# Patient Record
Sex: MALE | Race: BLACK OR AFRICAN AMERICAN
[De-identification: names, ages, dates, MRNs, and addresses within clinical notes are randomized per-mention and may not be internally consistent; named-entity substitution may affect disease eponyms.]

---

## 2017-01-03 ENCOUNTER — APPOINTMENT (OUTPATIENT)
Dept: INTERNAL MEDICINE | Facility: CLINIC | Age: 37
End: 2017-01-03

## 2017-01-03 VITALS
SYSTOLIC BLOOD PRESSURE: 126 MMHG | BODY MASS INDEX: 28.34 KG/M2 | HEART RATE: 57 BPM | DIASTOLIC BLOOD PRESSURE: 78 MMHG | HEIGHT: 77 IN | WEIGHT: 240 LBS | TEMPERATURE: 98.6 F | RESPIRATION RATE: 17 BRPM | OXYGEN SATURATION: 100 %

## 2017-01-05 ENCOUNTER — APPOINTMENT (OUTPATIENT)
Dept: PAIN MANAGEMENT | Facility: CLINIC | Age: 37
End: 2017-01-05

## 2017-01-05 DIAGNOSIS — F07.81 POSTCONCUSSIONAL SYNDROME: ICD-10-CM

## 2017-01-05 DIAGNOSIS — R51 HEADACHE: ICD-10-CM

## 2017-04-11 ENCOUNTER — APPOINTMENT (OUTPATIENT)
Dept: INTERNAL MEDICINE | Facility: CLINIC | Age: 37
End: 2017-04-11

## 2017-04-11 VITALS
HEIGHT: 77 IN | DIASTOLIC BLOOD PRESSURE: 78 MMHG | SYSTOLIC BLOOD PRESSURE: 144 MMHG | OXYGEN SATURATION: 98 % | HEART RATE: 59 BPM | RESPIRATION RATE: 16 BRPM | WEIGHT: 245 LBS | TEMPERATURE: 98.5 F | BODY MASS INDEX: 28.93 KG/M2

## 2017-04-11 DIAGNOSIS — Z87.09 PERSONAL HISTORY OF OTHER DISEASES OF THE RESPIRATORY SYSTEM: ICD-10-CM

## 2017-05-04 ENCOUNTER — APPOINTMENT (OUTPATIENT)
Dept: INTERNAL MEDICINE | Facility: CLINIC | Age: 37
End: 2017-05-04

## 2017-05-04 VITALS
HEIGHT: 77 IN | DIASTOLIC BLOOD PRESSURE: 80 MMHG | SYSTOLIC BLOOD PRESSURE: 131 MMHG | TEMPERATURE: 98.7 F | WEIGHT: 245 LBS | HEART RATE: 59 BPM | OXYGEN SATURATION: 98 % | RESPIRATION RATE: 17 BRPM | BODY MASS INDEX: 28.93 KG/M2

## 2017-05-04 DIAGNOSIS — M54.5 LOW BACK PAIN: ICD-10-CM

## 2017-05-08 LAB
25(OH)D3 SERPL-MCNC: 26.7 NG/ML
ADJUSTED MITOGEN: >10 IU/ML
ADJUSTED TB AG: -0.04 IU/ML
ALBUMIN SERPL ELPH-MCNC: 4.7 G/DL
ALP BLD-CCNC: 104 U/L
ALT SERPL-CCNC: 56 U/L
ANION GAP SERPL CALC-SCNC: 17 MMOL/L
APPEARANCE: CLEAR
AST SERPL-CCNC: 102 U/L
BASOPHILS # BLD AUTO: 0.01 K/UL
BASOPHILS NFR BLD AUTO: 0.2 %
BILIRUB SERPL-MCNC: 0.3 MG/DL
BILIRUBIN URINE: NEGATIVE
BLOOD URINE: NEGATIVE
BUN SERPL-MCNC: 18 MG/DL
CALCIUM SERPL-MCNC: 10.3 MG/DL
CHLORIDE SERPL-SCNC: 100 MMOL/L
CHOLEST SERPL-MCNC: 200 MG/DL
CHOLEST/HDLC SERPL: 3.1 RATIO
CO2 SERPL-SCNC: 23 MMOL/L
COLOR: YELLOW
CREAT SERPL-MCNC: 1.36 MG/DL
EOSINOPHIL # BLD AUTO: 0.17 K/UL
EOSINOPHIL NFR BLD AUTO: 3.8 %
GLUCOSE QUALITATIVE U: NORMAL MG/DL
GLUCOSE SERPL-MCNC: 90 MG/DL
HAV IGG+IGM SER QL: REACTIVE
HBA1C MFR BLD HPLC: 6 %
HBV SURFACE AB SER QL: REACTIVE
HCT VFR BLD CALC: 42.3 %
HCV AB SER QL: NONREACTIVE
HCV S/CO RATIO: 0.09 S/CO
HDLC SERPL-MCNC: 64 MG/DL
HGB BLD-MCNC: 14.9 G/DL
HIV1+2 AB SPEC QL IA.RAPID: NONREACTIVE
IMM GRANULOCYTES NFR BLD AUTO: 0 %
KETONES URINE: NEGATIVE
LDLC SERPL CALC-MCNC: 122 MG/DL
LEUKOCYTE ESTERASE URINE: NEGATIVE
LYMPHOCYTES # BLD AUTO: 2 K/UL
LYMPHOCYTES NFR BLD AUTO: 44.4 %
M TB IFN-G BLD-IMP: NEGATIVE
MAN DIFF?: NORMAL
MCHC RBC-ENTMCNC: 29.8 PG
MCHC RBC-ENTMCNC: 35.2 GM/DL
MCV RBC AUTO: 84.6 FL
MONOCYTES # BLD AUTO: 0.32 K/UL
MONOCYTES NFR BLD AUTO: 7.1 %
NEUTROPHILS # BLD AUTO: 2 K/UL
NEUTROPHILS NFR BLD AUTO: 44.5 %
NITRITE URINE: NEGATIVE
PH URINE: 8
PLATELET # BLD AUTO: 218 K/UL
POTASSIUM SERPL-SCNC: 4.7 MMOL/L
PROT SERPL-MCNC: 8.1 G/DL
PROTEIN URINE: NEGATIVE MG/DL
QUANTIFERON GOLD NIL: 0.09 IU/ML
RBC # BLD: 5 M/UL
RBC # FLD: 13.1 %
SODIUM SERPL-SCNC: 140 MMOL/L
SPECIFIC GRAVITY URINE: 1.01
TRIGL SERPL-MCNC: 72 MG/DL
TSH SERPL-ACNC: 0.9 UIU/ML
UROBILINOGEN URINE: NORMAL MG/DL
WBC # FLD AUTO: 4.5 K/UL

## 2017-05-22 ENCOUNTER — NON-APPOINTMENT (OUTPATIENT)
Age: 37
End: 2017-05-22

## 2017-05-22 ENCOUNTER — APPOINTMENT (OUTPATIENT)
Dept: INTERNAL MEDICINE | Facility: CLINIC | Age: 37
End: 2017-05-22

## 2017-05-22 VITALS
TEMPERATURE: 98.6 F | DIASTOLIC BLOOD PRESSURE: 70 MMHG | BODY MASS INDEX: 28.69 KG/M2 | HEART RATE: 54 BPM | OXYGEN SATURATION: 98 % | SYSTOLIC BLOOD PRESSURE: 126 MMHG | HEIGHT: 77 IN | WEIGHT: 243 LBS | RESPIRATION RATE: 16 BRPM

## 2017-05-22 DIAGNOSIS — R73.03 PREDIABETES.: ICD-10-CM

## 2017-05-22 DIAGNOSIS — Z00.00 ENCOUNTER FOR GENERAL ADULT MEDICAL EXAMINATION W/OUT ABNORMAL FINDINGS: ICD-10-CM

## 2017-05-22 DIAGNOSIS — R94.5 ABNORMAL RESULTS OF LIVER FUNCTION STUDIES: ICD-10-CM

## 2017-05-23 LAB
ALBUMIN SERPL ELPH-MCNC: 4.5 G/DL
ALP BLD-CCNC: 93 U/L
ALT SERPL-CCNC: 41 U/L
AST SERPL-CCNC: 35 U/L
BILIRUB DIRECT SERPL-MCNC: 0.1 MG/DL
BILIRUB INDIRECT SERPL-MCNC: 0.1 MG/DL
BILIRUB SERPL-MCNC: 0.2 MG/DL
HBV SURFACE AG SER QL: NONREACTIVE
IRON SATN MFR SERPL: 17 %
IRON SERPL-MCNC: 52 UG/DL
PROT SERPL-MCNC: 7.8 G/DL
TIBC SERPL-MCNC: 306 UG/DL
TRANSFERRIN SERPL-MCNC: 264 MG/DL
UIBC SERPL-MCNC: 254 UG/DL

## 2017-06-21 ENCOUNTER — APPOINTMENT (OUTPATIENT)
Dept: INTERNAL MEDICINE | Facility: CLINIC | Age: 37
End: 2017-06-21

## 2017-06-21 VITALS
TEMPERATURE: 98.2 F | BODY MASS INDEX: 28.57 KG/M2 | DIASTOLIC BLOOD PRESSURE: 80 MMHG | WEIGHT: 242 LBS | HEIGHT: 77 IN | HEART RATE: 74 BPM | OXYGEN SATURATION: 100 % | RESPIRATION RATE: 16 BRPM | SYSTOLIC BLOOD PRESSURE: 130 MMHG

## 2017-06-21 DIAGNOSIS — Z86.69 PERSONAL HISTORY OF OTHER DISEASES OF THE NERVOUS SYSTEM AND SENSE ORGANS: ICD-10-CM

## 2017-06-21 DIAGNOSIS — H00.019 HORDEOLUM EXTERNUM UNSPECIFIED EYE, UNSPECIFIED EYELID: ICD-10-CM

## 2017-09-23 ENCOUNTER — INPATIENT (INPATIENT)
Facility: HOSPITAL | Age: 37
LOS: 0 days | Discharge: HOME | DRG: 355 | End: 2017-09-24
Attending: SURGERY | Admitting: SURGERY
Payer: OTHER GOVERNMENT

## 2017-09-23 VITALS
HEART RATE: 59 BPM | RESPIRATION RATE: 16 BRPM | TEMPERATURE: 98 F | WEIGHT: 242.07 LBS | OXYGEN SATURATION: 98 % | SYSTOLIC BLOOD PRESSURE: 121 MMHG | DIASTOLIC BLOOD PRESSURE: 56 MMHG | HEIGHT: 77 IN

## 2017-09-23 DIAGNOSIS — R10.9 UNSPECIFIED ABDOMINAL PAIN: ICD-10-CM

## 2017-09-23 DIAGNOSIS — K42.9 UMBILICAL HERNIA WITHOUT OBSTRUCTION OR GANGRENE: ICD-10-CM

## 2017-09-23 LAB
ANION GAP SERPL CALC-SCNC: 5 MMOL/L — SIGNIFICANT CHANGE UP (ref 5–17)
BASOPHILS # BLD AUTO: 0.1 K/UL — SIGNIFICANT CHANGE UP (ref 0–0.2)
BASOPHILS NFR BLD AUTO: 1.1 % — SIGNIFICANT CHANGE UP (ref 0–2)
BUN SERPL-MCNC: 10 MG/DL — SIGNIFICANT CHANGE UP (ref 7–18)
CALCIUM SERPL-MCNC: 9.4 MG/DL — SIGNIFICANT CHANGE UP (ref 8.4–10.5)
CHLORIDE SERPL-SCNC: 107 MMOL/L — SIGNIFICANT CHANGE UP (ref 96–108)
CK SERPL-CCNC: 1189 U/L — HIGH (ref 35–232)
CO2 SERPL-SCNC: 29 MMOL/L — SIGNIFICANT CHANGE UP (ref 22–31)
CREAT SERPL-MCNC: 1.31 MG/DL — HIGH (ref 0.5–1.3)
EOSINOPHIL # BLD AUTO: 0 K/UL — SIGNIFICANT CHANGE UP (ref 0–0.5)
EOSINOPHIL NFR BLD AUTO: 0.6 % — SIGNIFICANT CHANGE UP (ref 0–6)
GLUCOSE SERPL-MCNC: 89 MG/DL — SIGNIFICANT CHANGE UP (ref 70–99)
HCT VFR BLD CALC: 43.8 % — SIGNIFICANT CHANGE UP (ref 39–50)
HGB BLD-MCNC: 14.2 G/DL — SIGNIFICANT CHANGE UP (ref 13–17)
LYMPHOCYTES # BLD AUTO: 2.2 K/UL — SIGNIFICANT CHANGE UP (ref 1–3.3)
LYMPHOCYTES # BLD AUTO: 39.8 % — SIGNIFICANT CHANGE UP (ref 13–44)
MCHC RBC-ENTMCNC: 28 PG — SIGNIFICANT CHANGE UP (ref 27–34)
MCHC RBC-ENTMCNC: 32.4 GM/DL — SIGNIFICANT CHANGE UP (ref 32–36)
MCV RBC AUTO: 86.4 FL — SIGNIFICANT CHANGE UP (ref 80–100)
MONOCYTES # BLD AUTO: 0.5 K/UL — SIGNIFICANT CHANGE UP (ref 0–0.9)
MONOCYTES NFR BLD AUTO: 8.5 % — SIGNIFICANT CHANGE UP (ref 2–14)
NEUTROPHILS # BLD AUTO: 2.7 K/UL — SIGNIFICANT CHANGE UP (ref 1.8–7.4)
NEUTROPHILS NFR BLD AUTO: 50 % — SIGNIFICANT CHANGE UP (ref 43–77)
PLATELET # BLD AUTO: 193 K/UL — SIGNIFICANT CHANGE UP (ref 150–400)
POTASSIUM SERPL-MCNC: 3.9 MMOL/L — SIGNIFICANT CHANGE UP (ref 3.5–5.3)
POTASSIUM SERPL-SCNC: 3.9 MMOL/L — SIGNIFICANT CHANGE UP (ref 3.5–5.3)
RBC # BLD: 5.06 M/UL — SIGNIFICANT CHANGE UP (ref 4.2–5.8)
RBC # FLD: 11.7 % — SIGNIFICANT CHANGE UP (ref 10.3–14.5)
SODIUM SERPL-SCNC: 141 MMOL/L — SIGNIFICANT CHANGE UP (ref 135–145)
WBC # BLD: 5.5 K/UL — SIGNIFICANT CHANGE UP (ref 3.8–10.5)
WBC # FLD AUTO: 5.5 K/UL — SIGNIFICANT CHANGE UP (ref 3.8–10.5)

## 2017-09-23 PROCEDURE — 74177 CT ABD & PELVIS W/CONTRAST: CPT | Mod: 26

## 2017-09-23 PROCEDURE — 99222 1ST HOSP IP/OBS MODERATE 55: CPT | Mod: 57,AI

## 2017-09-23 PROCEDURE — 99285 EMERGENCY DEPT VISIT HI MDM: CPT

## 2017-09-23 RX ORDER — SODIUM CHLORIDE 9 MG/ML
1000 INJECTION INTRAMUSCULAR; INTRAVENOUS; SUBCUTANEOUS ONCE
Qty: 0 | Refills: 0 | Status: COMPLETED | OUTPATIENT
Start: 2017-09-23 | End: 2017-09-23

## 2017-09-23 RX ORDER — SODIUM CHLORIDE 9 MG/ML
1000 INJECTION, SOLUTION INTRAVENOUS
Qty: 0 | Refills: 0 | Status: DISCONTINUED | OUTPATIENT
Start: 2017-09-23 | End: 2017-09-24

## 2017-09-23 RX ORDER — MORPHINE SULFATE 50 MG/1
4 CAPSULE, EXTENDED RELEASE ORAL EVERY 4 HOURS
Qty: 0 | Refills: 0 | Status: DISCONTINUED | OUTPATIENT
Start: 2017-09-23 | End: 2017-09-24

## 2017-09-23 RX ORDER — ONDANSETRON 8 MG/1
4 TABLET, FILM COATED ORAL EVERY 6 HOURS
Qty: 0 | Refills: 0 | Status: DISCONTINUED | OUTPATIENT
Start: 2017-09-23 | End: 2017-09-24

## 2017-09-23 RX ORDER — KETOROLAC TROMETHAMINE 30 MG/ML
30 SYRINGE (ML) INJECTION ONCE
Qty: 0 | Refills: 0 | Status: DISCONTINUED | OUTPATIENT
Start: 2017-09-23 | End: 2017-09-23

## 2017-09-23 RX ORDER — MORPHINE SULFATE 50 MG/1
4 CAPSULE, EXTENDED RELEASE ORAL ONCE
Qty: 0 | Refills: 0 | Status: DISCONTINUED | OUTPATIENT
Start: 2017-09-23 | End: 2017-09-23

## 2017-09-23 RX ORDER — DIAZEPAM 5 MG
5 TABLET ORAL ONCE
Qty: 0 | Refills: 0 | Status: DISCONTINUED | OUTPATIENT
Start: 2017-09-23 | End: 2017-09-23

## 2017-09-23 RX ADMIN — SODIUM CHLORIDE 1000 MILLILITER(S): 9 INJECTION INTRAMUSCULAR; INTRAVENOUS; SUBCUTANEOUS at 17:53

## 2017-09-23 RX ADMIN — Medication 5 MILLIGRAM(S): at 17:53

## 2017-09-23 RX ADMIN — SODIUM CHLORIDE 1000 MILLILITER(S): 9 INJECTION INTRAMUSCULAR; INTRAVENOUS; SUBCUTANEOUS at 20:46

## 2017-09-23 RX ADMIN — SODIUM CHLORIDE 1000 MILLILITER(S): 9 INJECTION INTRAMUSCULAR; INTRAVENOUS; SUBCUTANEOUS at 18:20

## 2017-09-23 RX ADMIN — Medication 30 MILLIGRAM(S): at 17:53

## 2017-09-23 RX ADMIN — Medication 30 MILLIGRAM(S): at 18:23

## 2017-09-23 NOTE — ED PROVIDER NOTE - ATTENDING CONTRIBUTION TO CARE
Patient with sudden onset abdominal pain after working out. on exam: tenderness to palpation to right rectus abdominus muscle. small soft umbilical hernia. No inguinal fullness or tenderness to palpation. CT shows fat containing umbilical hernia. Pain located more laterally but there is some umbilical tenderness to palpation. home with NSAIDS, surgery followup Patient with sudden onset abdominal pain after working out. on exam: tenderness to palpation to right rectus abdominus muscle and tenderness to palpation to small umbilical hernia. No inguinal fullness or tenderness to palpation. CT shows fat containing umbilical hernia. Pain located more laterally but there is some umbilical tenderness to palpation. seen by surgery, admit for surgical repair tomorrow.

## 2017-09-23 NOTE — H&P ADULT - ATTENDING COMMENTS
Agree with above  Incarcerated umbilical hernia causing significant pain.  Abd exam notable for nonreducible umbo hernia  CT reviewed - incarcerated omentum    Plan for OR for open umbo hernia repair possible mesh  Consent obtained

## 2017-09-23 NOTE — H&P ADULT - HISTORY OF PRESENT ILLNESS
38 y/o male denies sig PMH; PSH-shoulder  c/o feeling "pop" around umbilicus while working out at gym today doing crunches; pain radiated down R side abd to groin    < from: CT Abdomen and Pelvis w/ IV Cont (09.23.17 @ 20:45) >  Normal appendix.    Small umbilical hernia containing fat.  Mild fatty stranding within the   hernia sac which may represent an element of fatty ischemia. Clinical   correlation for incarceration is recommended.    No bowel obstruction.    < end of copied text >

## 2017-09-23 NOTE — H&P ADULT - NSHPPHYSICALEXAM_GEN_ALL_CORE
NAD  alert, oriented x3  S1S2  abd soft, tender around umbilicus with hernia appreciated, tender upon examination; pt will not allow further exam; no guarding or peritoneal signs  ext no c/c/e NAD  alert, oriented x3  S1S2  abd soft, tender around umbilicus with hernia appreciated, tender upon examination; pt will not allow further exam; no guarding or peritoneal signs  ext no c/c/e  heent no icterus  derm no jaundice

## 2017-09-23 NOTE — ED PROVIDER NOTE - PHYSICAL EXAMINATION
Abdomen soft, tender to RLQ/right suprapubic area/right inguinal tenderness. No bulging masses. No discoloration. Unremarkable testicular exam. No bulging during examination of inguinal canals.

## 2017-09-23 NOTE — ED PROVIDER NOTE - OBJECTIVE STATEMENT
37 year-old male, no significant PMHx, presents with cc abdominal pain today. While doing upside down crunches at the gym today felt sudden onset of "pop" sensation in the RLQ/suprapubic area with onset of sharp pain to RLQ radiating to right suprapubic and right inguinal area. Pain is worse with movement, partially alleviated while at rest. Associated with bilateral testicular pain. 37 year-old male, no significant PMHx, presents with cc abdominal pain today. While doing upside down crunches at the gym today felt sudden onset of "pop" sensation in the RLQ/suprapubic area with onset of sharp pain to RLQ radiating to right suprapubic and right inguinal area. Pain is worse with movement, partially alleviated while at rest. Associated with bilateral testicular pain. Denies back pain, urinary symptoms, numbness/tingling, focal weakness or any other complaints.

## 2017-09-23 NOTE — H&P ADULT - NSHPLABSRESULTS_GEN_ALL_CORE
< from: CT Abdomen and Pelvis w/ IV Cont (09.23.17 @ 20:45) >      EXAM:  CT ABDOMEN AND PELVIS IC                            PROCEDURE DATE:  09/23/2017          INTERPRETATION:  CLINICAL INFORMATION: Severe right lower quadrant and   suprapubic pain. Question hernia.    COMPARISON: No prior study for direct comparison.    PROCEDURE: CT abdomen and pelvis was performed after administration of   intravenous contrast. Coronal and sagittal reformatted images were   obtained. 90 cc of Omnipaque 350 was used. 10 cc was discarded.      FINDINGS:    LOWER CHEST: Within normal limits.        LIVER: Within normal limits.  BILE DUCTS: Normal caliber.  GALLBLADDER: Within normal limits.  SPLEEN: Within normal limits.  PANCREAS: Within normal limits.  ADRENALS: Within normal limits.  KIDNEYS/URETERS: Within normal limits.    URINARY BLADDER: Within normal limits.  REPRODUCTIVE ORGANS: Within normal limits    BOWEL/MESENTERY: Evaluation of the gastrointestinal tract is limited by   motion artifact and lack of oral contrast. No bowel obstruction or   abnormal wall thickening. Normal appendix.  PERITONEUM/RETROPERITONEUM: No free air, free fluid or fluid collections.  VESSELS:  Within normal limits.  LYMPH NODES: No abdominal or pelvic lymphadenopathy.  SOFT TISSUES: Small umbilical hernia containing fat. Mild fatty stranding   within the hernia sac which may represent an element of fatty ischemia.  BONES: Tiny scattered sclerotic foci in the bony pelvis and left proximal   femur which may represent bone islands. Small lower thoracic endplate   Schmorl's nodes.    IMPRESSION:     Normal appendix.    Small umbilical hernia containing fat.  Mild fatty stranding within the   hernia sac which may represent an element of fatty ischemia. Clinical   correlation for incarceration is recommended.    No bowel obstruction    < end of copied text >

## 2017-09-23 NOTE — ED ADULT NURSE NOTE - OBJECTIVE STATEMENT
Patient presented to er with c/o mid abdominal pain after exercising at the gym denies trauma, denies nausea or vomiting

## 2017-09-23 NOTE — ED PROVIDER NOTE - MEDICAL DECISION MAKING DETAILS
37 year-old male, presents with cc abdominal/inguinal pain today. Appears uncomfortable, vital signs within normal limits, afebrile. History and findings suggestive of abdminal rectus tear. Low suspicion of incarcerated/strangulated hernia. Plan: labs, IVF, meds and re-assess.

## 2017-09-23 NOTE — ED PROVIDER NOTE - TOBACCO USE
Getting Rx ready for signature    I need a copy of her ADD neuropsych eval.  Need to file into chart.  If has not had one, cannot cont. Long term Rx without it.  Refer [facility] or [facility].     Never smoker

## 2017-09-24 ENCOUNTER — TRANSCRIPTION ENCOUNTER (OUTPATIENT)
Age: 37
End: 2017-09-24

## 2017-09-24 VITALS
OXYGEN SATURATION: 99 % | TEMPERATURE: 98 F | DIASTOLIC BLOOD PRESSURE: 55 MMHG | HEART RATE: 63 BPM | RESPIRATION RATE: 17 BRPM | SYSTOLIC BLOOD PRESSURE: 113 MMHG

## 2017-09-24 LAB
ALLERGY+IMMUNOLOGY DIAG STUDY NOTE: SIGNIFICANT CHANGE UP
ANION GAP SERPL CALC-SCNC: 4 MMOL/L — LOW (ref 5–17)
BUN SERPL-MCNC: 10 MG/DL — SIGNIFICANT CHANGE UP (ref 7–18)
CALCIUM SERPL-MCNC: 8.5 MG/DL — SIGNIFICANT CHANGE UP (ref 8.4–10.5)
CHLORIDE SERPL-SCNC: 109 MMOL/L — HIGH (ref 96–108)
CK SERPL-CCNC: 950 U/L — HIGH (ref 35–232)
CO2 SERPL-SCNC: 28 MMOL/L — SIGNIFICANT CHANGE UP (ref 22–31)
CREAT SERPL-MCNC: 1.31 MG/DL — HIGH (ref 0.5–1.3)
GLUCOSE SERPL-MCNC: 102 MG/DL — HIGH (ref 70–99)
INR BLD: 1.17 RATIO — HIGH (ref 0.88–1.16)
POTASSIUM SERPL-MCNC: 3.9 MMOL/L — SIGNIFICANT CHANGE UP (ref 3.5–5.3)
POTASSIUM SERPL-SCNC: 3.9 MMOL/L — SIGNIFICANT CHANGE UP (ref 3.5–5.3)
PROTHROM AB SERPL-ACNC: 12.8 SEC — HIGH (ref 9.8–12.7)
SODIUM SERPL-SCNC: 141 MMOL/L — SIGNIFICANT CHANGE UP (ref 135–145)

## 2017-09-24 PROCEDURE — 49587: CPT | Mod: AS

## 2017-09-24 PROCEDURE — 36415 COLL VENOUS BLD VENIPUNCTURE: CPT

## 2017-09-24 PROCEDURE — 93005 ELECTROCARDIOGRAM TRACING: CPT

## 2017-09-24 PROCEDURE — C1781: CPT

## 2017-09-24 PROCEDURE — 49587: CPT

## 2017-09-24 PROCEDURE — 85610 PROTHROMBIN TIME: CPT

## 2017-09-24 PROCEDURE — 85027 COMPLETE CBC AUTOMATED: CPT

## 2017-09-24 PROCEDURE — 86901 BLOOD TYPING SEROLOGIC RH(D): CPT

## 2017-09-24 PROCEDURE — 99285 EMERGENCY DEPT VISIT HI MDM: CPT | Mod: 25

## 2017-09-24 PROCEDURE — 74177 CT ABD & PELVIS W/CONTRAST: CPT

## 2017-09-24 PROCEDURE — 86900 BLOOD TYPING SEROLOGIC ABO: CPT

## 2017-09-24 PROCEDURE — 82550 ASSAY OF CK (CPK): CPT

## 2017-09-24 PROCEDURE — 93010 ELECTROCARDIOGRAM REPORT: CPT

## 2017-09-24 PROCEDURE — 80048 BASIC METABOLIC PNL TOTAL CA: CPT

## 2017-09-24 PROCEDURE — 86850 RBC ANTIBODY SCREEN: CPT

## 2017-09-24 RX ORDER — HYDROMORPHONE HYDROCHLORIDE 2 MG/ML
0.5 INJECTION INTRAMUSCULAR; INTRAVENOUS; SUBCUTANEOUS
Qty: 0 | Refills: 0 | Status: DISCONTINUED | OUTPATIENT
Start: 2017-09-24 | End: 2017-09-24

## 2017-09-24 RX ORDER — ACETAMINOPHEN 500 MG
650 TABLET ORAL EVERY 6 HOURS
Qty: 0 | Refills: 0 | Status: DISCONTINUED | OUTPATIENT
Start: 2017-09-24 | End: 2017-09-24

## 2017-09-24 RX ORDER — ONDANSETRON 8 MG/1
4 TABLET, FILM COATED ORAL EVERY 6 HOURS
Qty: 0 | Refills: 0 | Status: DISCONTINUED | OUTPATIENT
Start: 2017-09-24 | End: 2017-09-24

## 2017-09-24 RX ORDER — OXYCODONE AND ACETAMINOPHEN 5; 325 MG/1; MG/1
1 TABLET ORAL EVERY 4 HOURS
Qty: 0 | Refills: 0 | Status: DISCONTINUED | OUTPATIENT
Start: 2017-09-24 | End: 2017-09-24

## 2017-09-24 RX ORDER — ACETAMINOPHEN 500 MG
1000 TABLET ORAL ONCE
Qty: 0 | Refills: 0 | Status: COMPLETED | OUTPATIENT
Start: 2017-09-24 | End: 2017-09-24

## 2017-09-24 RX ORDER — ONDANSETRON 8 MG/1
4 TABLET, FILM COATED ORAL ONCE
Qty: 0 | Refills: 0 | Status: DISCONTINUED | OUTPATIENT
Start: 2017-09-24 | End: 2017-09-24

## 2017-09-24 RX ORDER — ACETAMINOPHEN 500 MG
1000 TABLET ORAL ONCE
Qty: 0 | Refills: 0 | Status: DISCONTINUED | OUTPATIENT
Start: 2017-09-24 | End: 2017-09-24

## 2017-09-24 RX ORDER — SODIUM CHLORIDE 9 MG/ML
1000 INJECTION, SOLUTION INTRAVENOUS
Qty: 0 | Refills: 0 | Status: DISCONTINUED | OUTPATIENT
Start: 2017-09-24 | End: 2017-09-24

## 2017-09-24 RX ORDER — ENOXAPARIN SODIUM 100 MG/ML
40 INJECTION SUBCUTANEOUS EVERY 24 HOURS
Qty: 0 | Refills: 0 | Status: DISCONTINUED | OUTPATIENT
Start: 2017-09-24 | End: 2017-09-24

## 2017-09-24 RX ADMIN — SODIUM CHLORIDE 150 MILLILITER(S): 9 INJECTION, SOLUTION INTRAVENOUS at 13:01

## 2017-09-24 RX ADMIN — SODIUM CHLORIDE 175 MILLILITER(S): 9 INJECTION, SOLUTION INTRAVENOUS at 01:05

## 2017-09-24 RX ADMIN — Medication 400 MILLIGRAM(S): at 02:52

## 2017-09-24 RX ADMIN — MORPHINE SULFATE 4 MILLIGRAM(S): 50 CAPSULE, EXTENDED RELEASE ORAL at 01:14

## 2017-09-24 RX ADMIN — HYDROMORPHONE HYDROCHLORIDE 0.5 MILLIGRAM(S): 2 INJECTION INTRAMUSCULAR; INTRAVENOUS; SUBCUTANEOUS at 11:52

## 2017-09-24 RX ADMIN — Medication 1000 MILLIGRAM(S): at 03:20

## 2017-09-24 RX ADMIN — MORPHINE SULFATE 4 MILLIGRAM(S): 50 CAPSULE, EXTENDED RELEASE ORAL at 01:09

## 2017-09-24 RX ADMIN — HYDROMORPHONE HYDROCHLORIDE 0.5 MILLIGRAM(S): 2 INJECTION INTRAMUSCULAR; INTRAVENOUS; SUBCUTANEOUS at 10:03

## 2017-09-24 NOTE — DISCHARGE NOTE ADULT - MEDICATION SUMMARY - MEDICATIONS TO TAKE
I will START or STAY ON the medications listed below when I get home from the hospital:    oxyCODONE-acetaminophen 5 mg-325 mg oral tablet  -- 1 tab(s) by mouth every 6 hours, As Needed -Moderate Pain MDD:4  -- Indication: For Pain

## 2017-09-24 NOTE — DISCHARGE NOTE ADULT - HOSPITAL COURSE
37 y.o. M with no significant PMH presents to Counts include 234 beds at the [facility] ER 9/23/17 c/o periumbilical pain while working out. CT abd/pelv showed fat containing umbilical hernia. Pt taken to OR next day for umbilical hernia repair. Procedure completed without complications and pt discharged AD#1 37 y.o. M with no significant PMH presents to Replaced by [facility] ER 9/23/17 c/o periumbilical pain while working out. CT abd/pelv showed fat containing umbilical hernia. Patient with significant pain from this incarcerated fat. Pt taken to OR next day for umbilical hernia repair with mesh	. Procedure completed without complications and pt discharged AD#1

## 2017-09-24 NOTE — DISCHARGE NOTE ADULT - CARE PLAN
Principal Discharge DX:	Umbilical hernia without obstruction and without gangrene  Goal:	wound healing  Instructions for follow-up, activity and diet:	May shower. Remove dressing in 48 hrs. Leave steristrips intact. Resume regular diet. No heavy lifting, straining, exercises for 2 weeks. Principal Discharge DX:	Umbilical hernia without obstruction and without gangrene  Goal:	wound healing  Instructions for follow-up, activity and diet:	May shower. Remove dressing in 48 hrs. Leave steristrips intact. Resume regular diet. No heavy lifting, straining, exercises for 2 weeks.  Secondary Diagnosis:	Creatinine elevation  Instructions for follow-up, activity and diet:	follow up with primary medical doctor for elevated creatinine level of 1.3

## 2017-09-24 NOTE — DISCHARGE NOTE ADULT - PATIENT PORTAL LINK FT
“You can access the Follow[employer] Patient Portal, offered by [facility], by registering with the following website: [email or web address]”

## 2017-09-24 NOTE — BRIEF OPERATIVE NOTE - PROCEDURE
<<-----Click on this checkbox to enter Procedure Umbilical hernia repair with mesh  09/25/2017    Active  [id number]

## 2017-09-24 NOTE — DISCHARGE NOTE ADULT - PLAN OF CARE
wound healing May shower. Remove dressing in 48 hrs. Leave steristrips intact. Resume regular diet. No heavy lifting, straining, exercises for 2 weeks. follow up with primary medical doctor for elevated creatinine level of 1.3

## 2017-09-27 DIAGNOSIS — K42.9 UMBILICAL HERNIA WITHOUT OBSTRUCTION OR GANGRENE: ICD-10-CM

## 2017-09-27 DIAGNOSIS — Z28.21 IMMUNIZATION NOT CARRIED OUT BECAUSE OF PATIENT REFUSAL: ICD-10-CM

## 2017-10-11 ENCOUNTER — APPOINTMENT (OUTPATIENT)
Dept: BARIATRICS | Facility: CLINIC | Age: 37
End: 2017-10-11
Payer: OTHER GOVERNMENT

## 2017-10-11 VITALS
SYSTOLIC BLOOD PRESSURE: 121 MMHG | OXYGEN SATURATION: 100 % | WEIGHT: 236 LBS | HEART RATE: 64 BPM | BODY MASS INDEX: 27.87 KG/M2 | TEMPERATURE: 99 F | HEIGHT: 77 IN | DIASTOLIC BLOOD PRESSURE: 72 MMHG

## 2017-10-11 DIAGNOSIS — Z48.89 ENCOUNTER FOR OTHER SPECIFIED SURGICAL AFTERCARE: ICD-10-CM

## 2017-10-11 PROCEDURE — 99024 POSTOP FOLLOW-UP VISIT: CPT

## 2017-10-11 RX ORDER — OFLOXACIN 3 MG/ML
0.3 SOLUTION/ DROPS OPHTHALMIC 4 TIMES DAILY
Qty: 1 | Refills: 0 | Status: DISCONTINUED | COMMUNITY
Start: 2017-06-21 | End: 2017-10-11

## 2018-07-15 NOTE — ED ADULT NURSE NOTE - BOWEL SOUNDS RLQ
DISPLAY PLAN FREE TEXT DISPLAY PLAN FREE TEXT DISPLAY PLAN FREE TEXT DISPLAY PLAN FREE TEXT DISPLAY PLAN FREE TEXT DISPLAY PLAN FREE TEXT DISPLAY PLAN FREE TEXT DISPLAY PLAN FREE TEXT DISPLAY PLAN FREE TEXT DISPLAY PLAN FREE TEXT DISPLAY PLAN FREE TEXT DISPLAY PLAN FREE TEXT DISPLAY PLAN FREE TEXT DISPLAY PLAN FREE TEXT DISPLAY PLAN FREE TEXT DISPLAY PLAN FREE TEXT present

## 2018-11-09 NOTE — ED PROVIDER NOTE - CPE EDP HEAD NORM PED
Patient is here for initial prenatal  Pap and gc/chlam ordered  Patient has no problems  Head atraumatic, normal cephalic shape.

## 2018-12-04 ENCOUNTER — FORM ENCOUNTER (OUTPATIENT)
Age: 38
End: 2018-12-04

## 2018-12-05 ENCOUNTER — APPOINTMENT (OUTPATIENT)
Dept: INTERNAL MEDICINE | Facility: CLINIC | Age: 38
End: 2018-12-05
Payer: OTHER GOVERNMENT

## 2018-12-05 ENCOUNTER — OUTPATIENT (OUTPATIENT)
Dept: OUTPATIENT SERVICES | Facility: HOSPITAL | Age: 38
LOS: 1 days | End: 2018-12-05
Payer: OTHER GOVERNMENT

## 2018-12-05 ENCOUNTER — APPOINTMENT (OUTPATIENT)
Dept: ULTRASOUND IMAGING | Facility: CLINIC | Age: 38
End: 2018-12-05
Payer: OTHER GOVERNMENT

## 2018-12-05 VITALS
BODY MASS INDEX: 29.52 KG/M2 | OXYGEN SATURATION: 100 % | WEIGHT: 250 LBS | HEART RATE: 85 BPM | SYSTOLIC BLOOD PRESSURE: 136 MMHG | DIASTOLIC BLOOD PRESSURE: 71 MMHG | TEMPERATURE: 98.6 F | HEIGHT: 77 IN | RESPIRATION RATE: 16 BRPM

## 2018-12-05 DIAGNOSIS — N50.811 RIGHT TESTICULAR PAIN: ICD-10-CM

## 2018-12-05 PROCEDURE — 99214 OFFICE O/P EST MOD 30 MIN: CPT

## 2018-12-05 PROCEDURE — 76870 US EXAM SCROTUM: CPT

## 2018-12-05 PROCEDURE — 76870 US EXAM SCROTUM: CPT | Mod: 26

## 2018-12-05 NOTE — HISTORY OF PRESENT ILLNESS
[FreeTextEntry1] : 37 yo male pre-diabetes presents to the office complaining of acute testicular pain since Sunday. Patient states that 4 days ago he noted that his right testicle was elevated and was causing him shooting pains. He has no urinary symptoms of discharge or frequency and no fevers or chills. Today in clinic the pain is about 4-5 and the patient describes it as a shooting discomfort. No injuries. No prior episodes. We discussed a stat ultrasound of his testes to rule out torsion versus other causes such as cyst or epididymitis versus hydrocele- he will be sent to radiology this morning. If he experiences worsening pain the patient should go to the emergency room. Urinalysis checked.\par \par ROS: no CP, no palpitations, no urinary symptoms or urinary discharge, no rashes\par PMH/FamHx: mother- OA\par Significant PMH: none\par Surgical Hx: L shoulder surgery\par Allergies: NKDA\par \par Brief Social History: [employer]\par Tobacco Use: Never smoked.\par EtOH/Drug use: Denies drugs, social alcohol use\par \par I\par

## 2018-12-05 NOTE — PHYSICAL EXAM
[General Appearance - Alert] : alert [General Appearance - In No Acute Distress] : in no acute distress [Sclera] : the sclera and conjunctiva were normal [PERRL With Normal Accommodation] : pupils were equal in size, round, and reactive to light [Extraocular Movements] : extraocular movements were intact [Outer Ear] : the ears and nose were normal in appearance [Both Tympanic Membranes Were Examined] : both tympanic membranes were normal [Neck Appearance] : the appearance of the neck was normal [Auscultation Breath Sounds / Voice Sounds] : lungs were clear to auscultation bilaterally [Heart Rate And Rhythm] : heart rate was normal and rhythm regular [Heart Sounds] : normal S1 and S2 [Murmurs] : no murmurs [Edema] : there was no peripheral edema [Bowel Sounds] : normal bowel sounds [Abdomen Soft] : soft [Abdomen Tenderness] : non-tender [No CVA Tenderness] : no ~M costovertebral angle tenderness [No Spinal Tenderness] : no spinal tenderness [Abnormal Walk] : normal gait [Nail Clubbing] : no clubbing  or cyanosis of the fingernails [Musculoskeletal - Swelling] : no joint swelling seen [Motor Tone] : muscle strength and tone were normal [Skin Color & Pigmentation] : normal skin color and pigmentation [Skin Turgor] : normal skin turgor [] : no rash [No Focal Deficits] : no focal deficits [Oriented To Time, Place, And Person] : oriented to person, place, and time [FreeTextEntry1] : R eye stye with redness

## 2018-12-05 NOTE — ASSESSMENT
[FreeTextEntry1] : 37 yo male pre-diabetes presents to the office complaining of acute testicular pain since Sunday.\par -We discussed a stat ultrasound of his testes to rule out torsion versus other causes such as cyst or epididymitis versus hydrocele- he will be sent to radiology this morning. If he experiences worsening pain the patient should go to the emergency room. Urinalysis checked.\par \par

## 2018-12-06 LAB
APPEARANCE: CLEAR
BACTERIA: NEGATIVE
BILIRUBIN URINE: NEGATIVE
BLOOD URINE: NEGATIVE
C TRACH RRNA SPEC QL NAA+PROBE: NOT DETECTED
COLOR: YELLOW
GLUCOSE QUALITATIVE U: NEGATIVE MG/DL
KETONES URINE: NEGATIVE
LEUKOCYTE ESTERASE URINE: NEGATIVE
MICROSCOPIC-UA: NORMAL
N GONORRHOEA RRNA SPEC QL NAA+PROBE: NOT DETECTED
NITRITE URINE: NEGATIVE
PH URINE: 7
PROTEIN URINE: NEGATIVE MG/DL
RED BLOOD CELLS URINE: 4 /HPF
SOURCE AMPLIFICATION: NORMAL
SPECIFIC GRAVITY URINE: 1.02
SQUAMOUS EPITHELIAL CELLS: 0 /HPF
UROBILINOGEN URINE: NEGATIVE MG/DL
WHITE BLOOD CELLS URINE: 1 /HPF

## 2018-12-08 LAB — BACTERIA UR CULT: NORMAL

## 2020-07-24 NOTE — PATIENT PROFILE ADULT. - SURGICAL SITE INCISION
[FreeTextEntry1] : PSA basically stable - no need for further intervention.\par also discussed not doing routine PSA based on his age  no

## 2020-12-15 PROBLEM — Z87.09 HISTORY OF ACUTE BRONCHITIS: Status: RESOLVED | Noted: 2017-04-11 | Resolved: 2020-12-15

## 2020-12-15 PROBLEM — Z86.69 HISTORY OF CONJUNCTIVITIS: Status: RESOLVED | Noted: 2017-06-21 | Resolved: 2020-12-15

## 2022-03-29 NOTE — ED ADULT NURSE NOTE - BREATH SOUNDS, MLM
Clear [facility]. I reviewed and verified the documentation and independently performed the documented:

## 2023-01-23 NOTE — ED ADULT TRIAGE NOTE - CADM TRG TX PRIOR TO ARRIVAL
"Ambrosio"Lorri Jones was seen and treated in our emergency department on 1/23/2023.  He may return to school on 01/25/2023.      If you have any questions or concerns, please don't hesitate to call.       LATOSHA" none

## 2023-06-21 NOTE — PATIENT PROFILE ADULT. - PAIN LOCATION, PROFILE
**For crisis resources, please see the information at the end of this document**   Patient Education    Thank you for coming to the [facility] [place] [facility].     Lab Testing:  If you had lab testing today and your results are reassuring or normal they will be mailed to you or sent through [employer] within 7 days. If the lab tests need quick action we will call you with the results. The phone number we will call with results is # [phone]. If this is not the best number please call our clinic and change the number.     Medication Refills:  If you need any refills please call your pharmacy and they will contact us. Our fax number for refills is [phone].   Three business days of notice are needed for general medication refill requests.   Five business days of notice are needed for controlled substance refill requests.   If you need to change to a different pharmacy, please contact the new pharmacy directly. The new pharmacy will help you get your medications transferred.     Contact Us:  Please call [phone] during business hours (8-5:00 M-F).   If you have medication related questions after clinic hours, or on the weekend, please call [phone].     Financial Assistance [phone]   Medical Records [phone]       MENTAL HEALTH CRISIS RESOURCES:  For a emergency help, please call 911 or go to the nearest Emergency Department.     Emergency Walk-In Options:   EmPATH Unit @ [facility] ([place]): [phone] - Specialized mental health emergency area designed to be calming  [facility] ([place]): [phone]  [facility] ([place]): [phone]  [facility]): [phone]    [facility] Crisis Information:   [place]: [phone]  [place]: [phone]  [place] ([place]) - Adult: [phone]     Child: [phone]  [place] - Adult: [phone]     Child: [phone]  [place]:  [phone]  List of all [facility] resources:   https://[place].gov/dhs/people-we-serve/adults/health-care/mental-health/resources/crisis-contacts.jsp    National Crisis Information:   Crisis Text Line: Text  [place]  to 999642  Suicide & Crisis Lifeline: 988  National Suicide Prevention Lifeline: 8-987-558-TALK (1-499.314.5001)       For online chat options, visit https://suicidepreventionlifeline.org/chat/  Poison Control Center: 5-917-643-3315  Trans Lifeline: 0-738-527-7215 - Hotline for transgender people of all ages  The [employer]: 8-211-701-5523 - Hotline for LGBT youth     For Non-Emergency Support:   Fast Tracker: Mental Health & Substance Use Disorder Resources -   https://www.[employer].org/         Back

## 2023-10-30 NOTE — ED PROVIDER NOTE - CHPI ED SYMPTOMS POS
1. CALLING FOR LAB RESULTS - SOMEONE CAME TO THE HOUSE LAST WEEK (poss on 10/25).  *I called [facility], [phone], and they will be faxing results now.    2.  ALSO STILL CONFUSED ABOUT BLOOD IN STOOL.  WOULD LIKE TO RE-DISCUSS THIS       PAIN

## 2024-12-08 NOTE — ED ADULT NURSE NOTE - AREA
[facility] Division of Kidney Diseases & Hypertension  FOLLOW UP NOTE  --------------------------------------------------------------------------------  Chief Complaint: GI bleed    24 hour events/subjective: Patient seen and evaluated this morning at bedside in ICU.  Mother present.  Patient had worsening hypotension overnight.  Not able to tolerate CRRT.  Pressors were capped.  Unable to go for TIPS.     PAST HISTORY  --------------------------------------------------------------------------------  No significant changes to PMH, PSH, FHx, SHx, unless otherwise noted    ALLERGIES & MEDICATIONS  --------------------------------------------------------------------------------  Allergies    amoxicillin (Angioedema)    Intolerances      Standing Inpatient Medications  chlorhexidine 0.12% Liquid 15 milliLiter(s) Oral Mucosa every 12 hours  chlorhexidine 2% Cloths 1 Application(s) Topical <User Schedule>  CRRT Treatment    <Continuous>  dextrose 5%. 1000 milliLiter(s) IV Continuous <Continuous>  dextrose 5%. 1000 milliLiter(s) IV Continuous <Continuous>  dextrose 50% Injectable 25 Gram(s) IV Push once  dextrose 50% Injectable 12.5 Gram(s) IV Push once  dextrose 50% Injectable 25 Gram(s) IV Push once  folic acid Injectable 1 milliGRAM(s) IV Push daily  glucagon  Injectable 1 milliGRAM(s) IntraMuscular once  insulin lispro (ADMELOG) corrective regimen sliding scale   SubCutaneous every 6 hours  meropenem  IVPB 1000 milliGRAM(s) IV Intermittent every 8 hours  mupirocin 2% Ointment 1 Application(s) Topical every 12 hours  norepinephrine Infusion 0.8 MICROgram(s)/kG/Min IV Continuous <Continuous>  octreotide  Infusion 50 MICROgram(s)/Hr IV Continuous <Continuous>  pantoprazole Infusion 8 mG/Hr IV Continuous <Continuous>  petrolatum Ophthalmic Ointment 1 Application(s) Both EYES two times a day  phytonadione  IVPB 10 milliGRAM(s) IV Intermittent daily  PrismaSATE Dialysate BK 0 / 3.5 5000 milliLiter(s) CRRT <Continuous>  PrismaSOL Filtration BGK 0 / 2.5 5000 milliLiter(s) CRRT <Continuous>  PrismaSOL Filtration BGK 0 / 2.5 5000 milliLiter(s) CRRT <Continuous>  propofol Infusion 30 MICROgram(s)/kG/Min IV Continuous <Continuous>  propofol Injectable 20 milliGRAM(s) IV Push once  sodium bicarbonate  Infusion 0.118 mEq/kG/Hr IV Continuous <Continuous>  thiamine IVPB 500 milliGRAM(s) IV Intermittent daily  vasopressin Infusion 0.04 Unit(s)/Min IV Continuous <Continuous>    PRN Inpatient Medications  dextrose Oral Gel 15 Gram(s) Oral once PRN      REVIEW OF SYSTEMS  --------------------------------------------------------------------------------  unable to obtain    VITALS/PHYSICAL EXAM  --------------------------------------------------------------------------------  T(C): 38.9 (12-08-24 @ 04:00), Max: 38.9 (12-08-24 @ 04:00)  HR: 127 (12-08-24 @ 08:00) (121 - 134)  BP: 85/53 (12-07-24 @ 13:04) (85/53 - 85/53)  ABP: 72/34 (12-08-24 @ 08:00) (72/34 - 97/60)  ABP(mean): 45 (12-08-24 @ 08:00) (45 - 72)  RR: 23 (12-08-24 @ 08:00) (18 - 30)  SpO2: 99% (12-08-24 @ 08:00) (94% - 100%)  CVP(mm Hg): --        12-07-24 @ 07:01  -  12-08-24 @ 07:00  --------------------------------------------------------  IN: 6085.7 mL / OUT: 2644 mL / NET: 3441.7 mL    12-08-24 @ 07:01  -  12-08-24 @ 07:36  --------------------------------------------------------  IN: 206.4 mL / OUT: 0 mL / NET: 206.4 mL    Physical Exam:  Gen: Ill appearing, sedated  HEENT: +ETT  Pulm: CTA B/L anteriorly  CV: Tachycardic, S1S2  Abd: +BS, soft, nondistended  Extremities: No bilateral LE edema  Neuro: Sedated  Skin: Warm  Vascular access: R-IJ non-tunneled dialysis catheter    LABS/STUDIES  --------------------------------------------------------------------------------              10.0   18.25 >-----------<  83       [12-08-24 @ 05:03]              27.2     127  |  89  |  25  ----------------------------<  57      [12-08-24 @ 05:03]  5.0   |  10  |  2.77        Ca     7.2     [12-08-24 @ 05:03]      Mg     1.80     [12-08-24 @ 05:03]      Phos  7.3     [12-08-24 @ 05:03]    TPro  4.9  /  Alb  2.1  /  TBili  6.6  /  DBili  x   /  AST  SeeComment SPECIMEN MODERATELY HEMOLYZED AND GROSSLY LIPEMIC.  /  ALT  SeeComment SPECIMEN MODERATELY HEMOLYZED AND GROSSLY LIPEMIC.  /  AlkPhos  276  [12-08-24 @ 05:03]    PT/INR: PT 65.0 , INR 5.56       [12-08-24 @ 05:03]  PTT: 48.5       [12-08-24 @ 05:03]      Creatinine Trend:  SCr 2.77 [12-08 @ 05:03]  SCr 2.00 [12-07 @ 23:03]  SCr 1.97 [12-07 @ 17:11]  SCr 1.77 [12-07 @ 12:10]  SCr 1.62 [12-07 @ 09:11]    Urinalysis - [12-08-24 @ 05:03]      Color  / Appearance  / SG  / pH       Gluc 57 / Ketone   / Bili  / Urobili        Blood  / Protein  / Leuk Est  / Nitrite       RBC  / WBC  / Hyaline  / Gran  / Sq Epi  / Non Sq Epi  / Bacteria            medial